# Patient Record
Sex: FEMALE | Race: WHITE
[De-identification: names, ages, dates, MRNs, and addresses within clinical notes are randomized per-mention and may not be internally consistent; named-entity substitution may affect disease eponyms.]

---

## 2018-08-12 ENCOUNTER — HOSPITAL ENCOUNTER (EMERGENCY)
Dept: HOSPITAL 68 - ERH | Age: 48
End: 2018-08-12
Payer: COMMERCIAL

## 2018-08-12 VITALS — HEIGHT: 58 IN | WEIGHT: 137 LBS | BODY MASS INDEX: 28.76 KG/M2

## 2018-08-12 VITALS — SYSTOLIC BLOOD PRESSURE: 138 MMHG | DIASTOLIC BLOOD PRESSURE: 80 MMHG

## 2018-08-12 DIAGNOSIS — R10.30: ICD-10-CM

## 2018-08-12 DIAGNOSIS — R11.2: Primary | ICD-10-CM

## 2018-08-12 LAB
ABSOLUTE GRANULOCYTE CT: 9.1 /CUMM (ref 1.4–6.5)
BASOPHILS # BLD: 0.1 /CUMM (ref 0–0.2)
BASOPHILS NFR BLD: 0.4 % (ref 0–2)
EOSINOPHIL # BLD: 0 /CUMM (ref 0–0.7)
EOSINOPHIL NFR BLD: 0.1 % (ref 0–5)
ERYTHROCYTE [DISTWIDTH] IN BLOOD BY AUTOMATED COUNT: 14.6 % (ref 11.5–14.5)
GRANULOCYTES NFR BLD: 72.9 % (ref 42.2–75.2)
HCT VFR BLD CALC: 44.9 % (ref 37–47)
LYMPHOCYTES # BLD: 2.6 /CUMM (ref 1.2–3.4)
MCH RBC QN AUTO: 30 PG (ref 27–31)
MCHC RBC AUTO-ENTMCNC: 33.4 G/DL (ref 33–37)
MCV RBC AUTO: 89.9 FL (ref 81–99)
MONOCYTES # BLD: 0.7 /CUMM (ref 0.1–0.6)
PLATELET # BLD: 340 /CUMM (ref 130–400)
PMV BLD AUTO: 8.5 FL (ref 7.4–10.4)
RED BLOOD CELL CT: 5 /CUMM (ref 4.2–5.4)
WBC # BLD AUTO: 12.6 /CUMM (ref 4.8–10.8)

## 2018-08-12 NOTE — CT SCAN REPORT
EXAMINATION:
CT ABDOMEN AND PELVIS WITH CONTRAST
 
CLINICAL INFORMATION:
Diffuse abdominal pain, rebound, guarding
 
COMPARISON:
CT 07/12/2018
 
TECHNIQUE:
Multidetector volumetric imaging was performed of the abdomen and pelvis
following IV administration of 95 mL of Optiray 320 intravenous contrast.
Sagittal and coronal reformatted images were obtained on the technologist's
workstation.
 
DLP:
251 mGy-cm
 
FINDINGS:
 
LUNG BASES: The visualized lung bases are unremarkable.
 
LIVER, GALLBLADDER, AND BILIARY TREE: The liver enhances homogeneously.
Stable small hypodense lesion within segment 4A of the liver measuring 9 mm
most consistent with a cyst. Gallbladder is surgically absent.
 
PANCREAS: Unremarkable.
 
SPLEEN: Unremarkable.
 
ADRENAL GLANDS: Unremarkable.
 
KIDNEYS AND URETERS: The kidneys are normal in size, shape, and attenuation.
No hydronephrosis, hydroureter, or calculi seen. No perinephric stranding.
 
BLADDER: Unremarkable.
 
GASTROINTESTINAL TRACT: A surgical anastomosis is identified in the right
lower quadrant, possibly from previous appendectomy. There are no dilated
loops of small or large bowel. No intra-abdominal free air or free fluid.
 
ABDOMINAL WALL: No significant hernia is appreciated.
 
LYMPH NODES: Normal.
 
VASCULAR: Unremarkable.
 
PELVIC VISCERA: Uterus appears surgically absent. No adnexal masses. Small
follicles are present within both ovaries. A hypodense structure within the
right ovary measures 8 mm. These are within normal limits.
 
OSSEOUS STRUCTURES: No acute findings.
 
IMPRESSION:
 
1. No acute intra-abdominal or pelvic findings.
2. Stable postsurgical changes in the right lower quadrant.
3. Status post cholecystectomy.

## 2018-08-12 NOTE — ED GI/GU/ABDOMINAL COMPLAINT
History of Present Illness
 
General
Chief Complaint: Abdominal Pain/Flank Pain
Stated Complaint: ABD PAIN HX COLITIS
Source: patient
Exam Limitations: no limitations
 
Vital Signs & Intake/Output
Vital Signs & Intake/Output
 Vital Signs
 
 
Date Time Temp Pulse Resp B/P B/P Pulse O2 O2 Flow FiO2
 
     Mean Ox Delivery Rate 
 
 1503 98.6 75 20 138/80  98 Room Air  
 
 1244 98.5 90 20 140/80  98 Room Air  
 
 1050 95.9 123 20 145/89  99 Room Air  
 
 
 
Allergies
Coded Allergies:
ibuprofen (Severe, THROAT CLOSURE, HIVES 18)
metoclopramide (From REGLAN) (Severe, ANGIODEMA 18)
cephalexin (From KEFLEX) (Intermediate, RASH, SWELLING 18)
venom-honey bee (Intermediate, HIVES - HORNETS 18)
amoxicillin (ANGIODEMA 18)
mushroom (THROAT CLOSURE 18)
venom-wasp (UNKNOWN 18)
 
Reconcile Medications
Duloxetine HCl 30 MG CAPSULE.DR   1 CAP PO DAILY MIGRAINES  (Reported)
Levetiracetam (Keppra) 750 MG TABLET   1 TAB PO BID SEIZURES  (Reported)
Methocarbamol (Robaxin-750) 750 MG TABLET   1 TAB PO QHS MUSCLE SPASMS  (
Reported)
Metronidazole (Flagyl) 500 MG TABLET   1 TAB PO BID colitis
Ondansetron (Ondansetron Odt) 4 MG TAB.RAPDIS   1 TAB SL BID PRN N/V  (Reported)
Ondansetron (Zofran Odt) 4 MG TAB.RAPDIS   1 TAB SL TID nausea
Oxcarbazepine 300 MG TABLET   1.5 TAB PO BID SEIZURES  (Reported)
Oxycodone HCl/Acetaminophen (Oxycodone-Acetaminophen ) 10 MG-325 MG TABLET
  1 TAB PO Q4-6H PRN PAIN -MAX OF 5 TABS A DAY  (Reported)
Oxycodone HCl/Acetaminophen (Percocet 5-325 MG Tablet) 5 MG-325 MG TABLET   1 
TAB PO BID Pain
 
Triage Note:
C/O LOWER ABDOMINAL PAIN WITH NAUSEA, VOMITING AND
 DIARREA X 7D DAYS.  PMH: COLITIS
Triage Nurses Notes Reviewed? yes
Pregnant? N
Is pt currently breastfeeding? No
HPI:
47-year-old female presents emergency department complaining of lower abdominal 
pain.  Symptoms started about 1 week ago.  She has been having watery, nonbloody
diarrhea.  She has been having frequent bouts per day.  States it is starting to
be mucus colored.  Patient does report a history of ulcerative colitis diagnosed
last year.  She has had a previous cholecystectomy and appendectomy.  She has 
not had any fevers or chills.  She has been nauseous and has had decreased 
intake secondary to the nausea.  She denies any associated chest pain or 
shortness of breath.  No sick contacts at home.  No recent travel.  No recent 
antibiotic use.  She has an appointment with her GI doctor scheduled for the 
 of this month.  Patient also has a seizure history but has been taking her 
medications as prescribed and denies any current seizure activity.  No EtOH use.
(Vitor Marvin PA-C)
 
Past History
 
Travel History
Traveled to [place] past 21 day No
 
Medical History
Any Pertinent Medical History? none
Neurological: migraine, seizure, fibromialgia
Cardiovascular: NONE
Gastrointestinal: colitis, irritable bowel syndrome
Musculoskeletal: degen joint disease, fibromyalgia
Blood Disorders: NONE
Cancer(s): NONE
GYN/Reproductive: NONE
 
Surgical History
Surgical History: non-contributory
 
Psychosocial History
Who do you live with Spouse
Services at Home None
What is your primary language English
Tobacco Use: Quit >30 days ago
ETOH Use: occasional use
 
Family History
Hx Contributory? No
(Vitor Marvin PA-C)
 
Review of Systems
 
Review of Systems
Constitutional:
Reports: see HPI. 
EENTM:
Reports: no symptoms. 
Respiratory:
Reports: no symptoms. 
Cardiovascular:
Reports: no symptoms. 
GI:
Reports: see HPI. 
Genitourinary:
Reports: no symptoms. 
Musculoskeletal:
Reports: no symptoms. 
Skin:
Reports: no symptoms. 
Neurological/Psychological:
Reports: no symptoms. 
All Other Systems: Reviewed and Negative
(Vitor Marvin PA-C)
 
Physical Exam
 
Physical Exam
General Appearance: well developed/nourished, mild distress
Head: atraumatic, normal appearance
Eyes:
Bilateral: normal appearance. 
Ears, Nose, Throat, Mouth: moist mucous membrane
Neck: supple
Respiratory: normal breath sounds, no respiratory distress, quiet respiration
Cardiovascular: regular rate/rhythm
Gastrointestinal: BS normal, soft, mild TTP b/l lower quadrants without rebound 
or guarding. Non distended, no masses, no organomegaly. No rashes. 
Extremities: normal range of motion
Skin: intact, normal color, warm/dry
 
Core Measures
ACS in differential dx? No
Sepsis Present: No
Sepsis Focused Exam Completed? No
(Yon SAHNI,Vitor)
 
Progress
Differential Diagnosis: biliary colic, bowel obstruction, diverticulitis, 
ectopic pregnancy, endometritis, gastritis, hemorrhoids, ischemic bowel, inflamm
bowel dis, kidney stone, ovarian cyst, ovarian torsion, pancreatitis, PID/
cervicitis, SBO, UTI/pyelo
Plan of Care:
 Orders
 
 
Procedure Date/time Status
 
URINALYSIS  115 Complete
 
LIPASE  115 Complete
 
COMPREHENSIVE METABOLIC PANEL  115 Complete
 
CBC WITHOUT DIFFERENTIAL  115 Complete
 
 
 Laboratory Tests
 
 
 
18 1255:
Urinalysis LIGHT  H, Urine Color ITZ, Urine Clarity HAZY  H, Urine pH 6.0, Ur 
Specific Gravity 1.025, Urine Protein 100  H, Urine Ketones >=80, Urine Nitrite 
NEG, Urine Bilirubin POS@ICTO  H, Urine Urobilinogen 1.0, Ur Leukocyte Esterase 
NEG, Ur Microscopic SEDIMENT EXAMINED, Urine RBC 1-3, Urine WBC 3-5  H, Ur 
Epithelial Cells MANY  H, Urine Bacteria MOD  H, Hyaline Casts 1-3  H, Urine 
Mucus MANY  H, Urine Hemoglobin TRACE-INTACT, Urine Glucose NEG
 
18 1150:
Anion Gap 15, Estimated GFR > 60, BUN/Creatinine Ratio 16.3, Glucose 94, Calcium
10.2, Total Bilirubin 0.8, AST 34, ALT 41, Alkaline Phosphatase 96, Total 
Protein 8.3  H, Albumin 4.9, Globulin 3.4, Albumin/Globulin Ratio 1.4, Lipase 
730  H, CBC w Diff NO MAN DIFF REQ, RBC 5.00, MCV 89.9, MCH 30.0, MCHC 33.4, RDW
14.6  H, MPV 8.5, Gran % 72.9, Lymphocytes % 20.9, Monocytes % 5.7, Eosinophils 
% 0.1, Basophils % 0.4, Absolute Granulocytes 9.1  H, Absolute Lymphocytes 2.6, 
Absolute Monocytes 0.7  H, Absolute Eosinophils 0, Absolute Basophils 0.1
 
Diagnostic Imaging:
Viewed by Me: CT Scan.  Discussed w/RAD: CT Scan. 
Radiology Impression: PATIENT: LEELA CUENCA  MEDICAL RECORD NO: [id number] PRESENT 
AGE: 47  PATIENT ACCOUNT NO: [id number] : [birth year]  LOCATION: ER ORDERING 
PHYSICIAN: Vitor Marvin PA-C     SERVICE DATE:  EXAM TYPE: CAT - CT 
ABD & PELVIS W IV CONTRAST EXAMINATION: CT ABDOMEN AND PELVIS WITH CONTRAST 
CLINICAL INFORMATION: Diffuse abdominal pain, rebound, guarding COMPARISON: CT 
2018 TECHNIQUE: Multidetector volumetric imaging was performed of the 
abdomen and pelvis following IV administration of 95 mL of Optiray 320 
intravenous contrast. Sagittal and coronal reformatted images were obtained on 
the technologist's workstation. DLP: 251 mGy-cm FINDINGS: LUNG BASES: The 
visualized lung bases are unremarkable. LIVER, GALLBLADDER, AND BILIARY TREE: 
The liver enhances homogeneously. Stable small hypodense lesion within segment 
4A of the liver measuring 9 mm most consistent with a cyst. Gallbladder is 
surgically absent. PANCREAS: Unremarkable. SPLEEN: Unremarkable. ADRENAL GLANDS:
Unremarkable. KIDNEYS AND URETERS: The kidneys are normal in size, shape, and 
attenuation. No hydronephrosis, hydroureter, or calculi seen. No perinephric 
stranding. BLADDER: Unremarkable. GASTROINTESTINAL TRACT: A surgical anastomosis
is identified in the right lower quadrant, possibly from previous appendectomy. 
There are no dilated loops of small or large bowel. No intra-abdominal free air 
or free fluid. ABDOMINAL WALL: No significant hernia is appreciated. LYMPH NODES
: Normal. VASCULAR: Unremarkable. PELVIC VISCERA: Uterus appears surgically 
absent. No adnexal masses. Small follicles are present within both ovaries. A 
hypodense structure within the right ovary measures 8 mm. These are within 
normal limits. OSSEOUS STRUCTURES: No acute findings. IMPRESSION: 1. No acute 
intra-abdominal or pelvic findings. 2. Stable postsurgical changes in the right 
lower quadrant. 3. Status post cholecystectomy. DICTATED BY: Fifi Angeles MD  
DATE/TIME DICTATED:18 :RAD.BECKWITH  DATE/TIME 
TRANSCRIBED:18 CONFIDENTIAL, DO NOT COPY WITHOUT APPROPRIATE 
AUTHORIZATION.  <Electronically signed in Other Vendor System>                  
                                                                     SIGNED BY: 
Fifi Angeles MD 18 [id number]
Initial ED EKG: none
Comments:
Patient is a well-appearing 47-year-old female who is presenting with bilateral 
lower quadrant abdominal pain for the past week associated with diarrhea and 
nausea.  Diarrhea is nonbloody.  She does have a history of ulcerative colitis 
and is scheduled to see her GI doctor later this month her symptoms have been 
acting up. On my exam she does have mild tenderness to palpation in the 
bilateral lower quadrants.  Left is worse than right.  She otherwise appears 
well on exam.  She is afebrile.  Her lab work is overall nonactionable, slightly
elevated lipase.  Urinalysis not indicative of infection.  She has no epigastric
pain to palpation.  She has had her gallbladder removed and appendix removed.  
She does not use EtOH.  Her CT scan showed no acute findings.  I have low 
concern for pancreatitis, appendicitis, choledocholithiasis, SBO. Patient was 
hydrated with IV fluids.  She was given IV Tylenol, morphine and Zofran with 
significant relief of her symptoms.  We will start her on Flagyl to cover for 
infection, short course of Percocet for breakthrough pain and Zofran for nausea.
 Encourage her to follow-up with her GI doctor this week.  Return to emergency 
department immediately if she develops any new or worsening symptoms.  She is in
agreement with plan of care.
(Yon SAHNI,Vitor)
 
Departure
 
Departure
Time of Disposition: 1507
Disposition: HOME OR SELF CARE
Condition: Stable
Clinical Impression
Primary Impression: Abdominal pain
Secondary Impressions: Nausea
Referrals:
Eliazar BARTON,Yaneth Corey (PCP/Family)
 
Additional Instructions:
Take Percocet sparingly as needed for pain.  Use Zofran as needed for nausea and
vomiting.  Eat a bland diet for at least 48 hours. Drink plenty of fluids. 
Follow up with your GI doctor and PCP this week. Return to ED with new or 
worsening symptoms. 
 
Please go over all results of today's visit with your primary care doctor.  
Contact your primary care doctor to let them know you were here in the emergency
room.  There may be nonspecific findings which may not be related to your visit 
today here in the emergency room but may require further evaluation and chronic 
monitoring by your primary care doctor.  If you had a laceration today the 
chance of foreign body always remains. You should follow-up with your primary 
care doctor for recheck in 3-5 days for a wound check.  If you had an x-ray done
there is a chance that a fracture could have been missed on initial read and you
should follow-up with your primary care doctor for repeat x-rays if symptoms 
persist.  If your blood pressure was elevated here in the emergency room please 
have rechecked by [facility] primary care doctor within the next 48.  If you were 
prescribed a narcotic here in the emergency room or any type of controlled 
substances you're not allowed to drive while taking this medication or operate 
any type of heavy machinery.  Narcotics can make you feel lightheaded dizziness 
nausea and can cause constipation.  You may need to  a stool softener.  
Thank you for choosing [facility] emergency room.  Please return to the 
emergency room immediately if you have any other concerns worsening of symptoms.
Departure Forms:
Customer Survey
General Discharge Information
Prescriptions:
Current Visit Scripts
Metronidazole (Flagyl) 1 TAB PO BID  
     #14 TAB 
 
Ondansetron (Zofran Odt) 1 TAB SL TID  
     #15 TAB 
 
Oxycodone HCl/Acetaminophen (Percocet 5-325 MG Tablet) 1 TAB PO BID  
     #10 TAB 
 
 
(Vitor Marvin PA-C)
 
PA/NP Co-Sign Statement
Statement:
ED Attending supervision documentation-
 
[X] I saw and evaluated the patient. I have also reviewed all the pertinent lab 
results and diagnostic results. I agree with the findings and the plan of care 
as documented in the PA's/NP's documentation. 
 
[X] I have reviewed the ED Record and agree with the PA's/NP's documentation.
 
[] Additions or exceptions (if any) to the PAs/NP's note and plan are 
summarized below:
[]
 
(Violette BARTON,Tenzin GRAY)